# Patient Record
Sex: FEMALE | Race: WHITE | NOT HISPANIC OR LATINO | Employment: UNEMPLOYED | URBAN - METROPOLITAN AREA
[De-identification: names, ages, dates, MRNs, and addresses within clinical notes are randomized per-mention and may not be internally consistent; named-entity substitution may affect disease eponyms.]

---

## 2021-02-11 ENCOUNTER — HOSPITAL ENCOUNTER (OUTPATIENT)
Dept: LAB | Facility: MEDICAL CENTER | Age: 31
End: 2021-02-11
Attending: FAMILY MEDICINE
Payer: COMMERCIAL

## 2021-02-11 LAB
COVID ORDER STATUS COVID19: NORMAL
SARS-COV-2 RNA RESP QL NAA+PROBE: NOTDETECTED
SPECIMEN SOURCE: NORMAL

## 2021-02-11 PROCEDURE — C9803 HOPD COVID-19 SPEC COLLECT: HCPCS

## 2021-02-11 PROCEDURE — U0003 INFECTIOUS AGENT DETECTION BY NUCLEIC ACID (DNA OR RNA); SEVERE ACUTE RESPIRATORY SYNDROME CORONAVIRUS 2 (SARS-COV-2) (CORONAVIRUS DISEASE [COVID-19]), AMPLIFIED PROBE TECHNIQUE, MAKING USE OF HIGH THROUGHPUT TECHNOLOGIES AS DESCRIBED BY CMS-2020-01-R: HCPCS

## 2021-02-11 PROCEDURE — U0005 INFEC AGEN DETEC AMPLI PROBE: HCPCS

## 2025-01-08 ENCOUNTER — OFFICE VISIT (OUTPATIENT)
Dept: URGENT CARE | Facility: CLINIC | Age: 35
End: 2025-01-08
Payer: COMMERCIAL

## 2025-01-08 ENCOUNTER — HOSPITAL ENCOUNTER (OUTPATIENT)
Dept: RADIOLOGY | Facility: MEDICAL CENTER | Age: 35
End: 2025-01-08
Attending: PHYSICIAN ASSISTANT
Payer: COMMERCIAL

## 2025-01-08 VITALS
TEMPERATURE: 97.2 F | HEIGHT: 62 IN | BODY MASS INDEX: 20.65 KG/M2 | DIASTOLIC BLOOD PRESSURE: 100 MMHG | RESPIRATION RATE: 13 BRPM | OXYGEN SATURATION: 100 % | HEART RATE: 77 BPM | WEIGHT: 112.2 LBS | SYSTOLIC BLOOD PRESSURE: 138 MMHG

## 2025-01-08 DIAGNOSIS — R03.0 ELEVATED BLOOD PRESSURE READING: ICD-10-CM

## 2025-01-08 DIAGNOSIS — F32.A DEPRESSION, UNSPECIFIED DEPRESSION TYPE: ICD-10-CM

## 2025-01-08 DIAGNOSIS — R10.11 RIGHT UPPER QUADRANT PAIN: ICD-10-CM

## 2025-01-08 DIAGNOSIS — N20.2 CALCULUS OF KIDNEY WITH CALCULUS OF URETER: ICD-10-CM

## 2025-01-08 DIAGNOSIS — F10.10 ALCOHOL ABUSE: ICD-10-CM

## 2025-01-08 LAB
APPEARANCE UR: CLEAR
BILIRUB UR STRIP-MCNC: NEGATIVE MG/DL
COLOR UR AUTO: YELLOW
GLUCOSE UR STRIP.AUTO-MCNC: NEGATIVE MG/DL
KETONES UR STRIP.AUTO-MCNC: NEGATIVE MG/DL
LEUKOCYTE ESTERASE UR QL STRIP.AUTO: NEGATIVE
NITRITE UR QL STRIP.AUTO: NEGATIVE
PH UR STRIP.AUTO: 7 [PH] (ref 5–8)
POCT INT CON NEG: NEGATIVE
POCT INT CON POS: POSITIVE
POCT URINE PREGNANCY TEST: NEGATIVE
PROT UR QL STRIP: NEGATIVE MG/DL
RBC UR QL AUTO: NEGATIVE
SP GR UR STRIP.AUTO: 1.01
UROBILINOGEN UR STRIP-MCNC: 0.2 MG/DL

## 2025-01-08 PROCEDURE — 81002 URINALYSIS NONAUTO W/O SCOPE: CPT | Performed by: PHYSICIAN ASSISTANT

## 2025-01-08 PROCEDURE — 3075F SYST BP GE 130 - 139MM HG: CPT | Performed by: PHYSICIAN ASSISTANT

## 2025-01-08 PROCEDURE — 99214 OFFICE O/P EST MOD 30 MIN: CPT | Performed by: PHYSICIAN ASSISTANT

## 2025-01-08 PROCEDURE — 81025 URINE PREGNANCY TEST: CPT | Performed by: PHYSICIAN ASSISTANT

## 2025-01-08 PROCEDURE — 76705 ECHO EXAM OF ABDOMEN: CPT

## 2025-01-08 PROCEDURE — 3080F DIAST BP >= 90 MM HG: CPT | Performed by: PHYSICIAN ASSISTANT

## 2025-01-08 RX ORDER — GABAPENTIN 300 MG/1
300 CAPSULE ORAL 3 TIMES DAILY
COMMUNITY

## 2025-01-08 RX ORDER — NALTREXONE HYDROCHLORIDE 50 MG/1
100 TABLET, FILM COATED ORAL DAILY
COMMUNITY
Start: 2024-12-02

## 2025-01-08 RX ORDER — BUPROPION HYDROCHLORIDE 300 MG/1
300 TABLET ORAL EVERY MORNING
Qty: 30 TABLET | Refills: 0 | Status: SHIPPED | OUTPATIENT
Start: 2025-01-08

## 2025-01-08 RX ORDER — BUPROPION HYDROCHLORIDE 300 MG/1
300 TABLET ORAL DAILY
COMMUNITY
Start: 2024-12-01

## 2025-01-08 NOTE — PROGRESS NOTES
Subjective:     Verbal consent was acquired by the patient to use Nex[employer] ambient listening note generation during this visit     Ofelia Carvajal is a 35 y.o. female who presents for RUQ Pain (Persistent pain, the last 3 weeks, she would like a stat US, she has a current order  )       History of Present Illness  The patient is a 35-year-old female who presents for evaluation of right upper quadrant pain, alcohol use disorder, and depression.    She reports experiencing a variety of pain sensations, including gnawing, stabbing, burning, and dull aches, which have been ongoing for the past 3 weeks. The pain is localized to the right side and does not radiate to the left. She has not identified any specific food triggers for her pain, although she did consume a burger yesterday without noticing any exacerbation of her symptoms. The pain tends to worsen in the evening and is currently constant. She has abstained from alcohol and maintains a healthy diet, although she admits to recent deviations due to the holiday season. She continues to exercise regularly. She reports no associated symptoms such as vomiting, diarrhea, or abnormal stools. She also reports no hematuria or fever. However, she has noticed a slight darkening of her urine, but it is not a cause for concern. She reports no dysuria, urinary frequency, or urgency. She does not experience heartburn, burping, belching, bloating, or acid reflux. She has undergone lab work and is awaiting the results. She anticipates elevated triglyceride levels due to her alcohol consumption. She has a history of heavy alcohol use but has recently ceased this habit. She has previously sought treatment at a clinic and has an order for an ultrasound, but the pain has progressively worsened. The discomfort has been disrupting her sleep and causing significant anxiety. She is unable to schedule an ultrasound for several weeks and is seeking immediate assistance.    She has a  "history of alcohol use disorder and has previously been in recovery. She is aware of the availability of Alcoholics Anonymous (AA) and has found naltrexone beneficial. She is currently under the care of a therapist at [facility]    She is currently taking Wellbutrin  mg for depression and is seeking a refill of this prescription. She has been on this medication for 2 years and has found it beneficial. She is unable to tolerate SSRIs due to an MTHFR mutation. She has previously consulted with a functional medicine practitioner but has not seen her recently as she is leaving her practice.    Supplemental Information  She has a history of PCOS since the age of 13 and has recently undergone a transvaginal ultrasound, which revealed a descending bladder. She experiences numbness and tingling in her vagina and cervix, but these symptoms have not been attributed to any specific cause. She has regular menstrual cycles.    SOCIAL HISTORY  The patient admits to being a heavy drinker. She is a student.    FAMILY HISTORY  She reports no family history of bladder or gallbladder issues.                Medications:  buPROPion  gabapentin Caps  naltrexone Tabs    Allergies:             Penicillins    Past Social Hx:  Ofelia MELI Alena  reports that she has quit smoking. Her smoking use included cigarettes. She does not have any smokeless tobacco history on file. She reports current alcohol use. She reports that she does not use drugs.           Problem list, medications, and allergies reviewed by myself today in Epic.     Objective:     BP (!) 138/100 (BP Location: Left arm, Patient Position: Sitting, BP Cuff Size: Adult)   Pulse 77   Temp 36.2 °C (97.2 °F) (Temporal)   Resp 13   Ht 1.575 m (5' 2\")   Wt 50.9 kg (112 lb 3.2 oz)   SpO2 100%   BMI 20.52 kg/m²     Physical Exam  Vitals and nursing note reviewed.   Constitutional:       General: She is not in acute distress.     Appearance: Normal appearance. She is not " ill-appearing, toxic-appearing or diaphoretic.   HENT:      Nose: Nose normal.      Mouth/Throat:      Mouth: Mucous membranes are moist.      Pharynx: No oropharyngeal exudate or posterior oropharyngeal erythema.   Eyes:      Extraocular Movements: Extraocular movements intact.      Pupils: Pupils are equal, round, and reactive to light.   Cardiovascular:      Rate and Rhythm: Normal rate and regular rhythm.      Pulses: Normal pulses.      Heart sounds: Normal heart sounds.   Pulmonary:      Effort: Pulmonary effort is normal. No tachypnea, accessory muscle usage, prolonged expiration, respiratory distress or retractions.      Breath sounds: Normal breath sounds and air entry. No stridor or decreased air movement. No decreased breath sounds, wheezing, rhonchi or rales.      Comments: Lungs cta b/l  Abdominal:      General: Abdomen is flat. Bowel sounds are normal. There is no distension. There are no signs of injury.      Palpations: Abdomen is soft. There is no splenomegaly, mass or pulsatile mass.      Tenderness: There is abdominal tenderness in the right upper quadrant. There is no right CVA tenderness, left CVA tenderness, guarding or rebound. Negative signs include Naik's sign, Rovsing's sign and McBurney's sign.      Hernia: No hernia is present.          Comments: Right upper quadrant tenderness noted.  Abdomen soft without guarding or rebound.  No CVA tenderness.  Negative Naik's.  Negative McBurney's.   Musculoskeletal:      Cervical back: No rigidity.   Lymphadenopathy:      Cervical: No cervical adenopathy.   Neurological:      Mental Status: She is alert.         UA negative  Urine hcg negative        RADIOLOGY RESULTS   US-RUQ    Result Date: 1/8/2025 1/8/2025 10:06 AM HISTORY/REASON FOR EXAM:  Pain; 3 week right upper quadrant pain Abdominal pain TECHNIQUE/EXAM DESCRIPTION AND NUMBER OF VIEWS:  Real-time sonography of the liver and biliary tree. COMPARISON: None FINDINGS: The liver measures  12.76 cm. The liver is heterogeneous with increased echogenicity. The echogenicity limits evaluation for liver mass. However, there is no evidence of solid mass lesion. The gallbladder is normal without wall thickening or calculi. The gallbladder wall thickness measures 1.70 mm. There is no pericholecystic fluid. The common duct measures 3.50 mm in diameter. The visualized pancreas is unremarkable. The visualized aorta is normal in caliber. Intrahepatic IVC is patent. The portal vein is patent with hepatopetal flow. The MPV measures 0.9 cm. The right kidney measures 10.01 cm. The right kidney has normal cortical size and echotexture. There is no hydronephrosis. There is a 3 mm calculus in the interpolar region There is no ascites.     1. Echogenic liver, most commonly hepatic steatosis. 2. Nonobstructive right renal calculus.              Assessment/Plan:     Diagnosis and Associated Orders:     1. Elevated blood pressure reading    2. Alcohol abuse    3. Right upper quadrant pain  - US-RUQ; Future  - POCT Urinalysis  - POCT PREGNANCY    4. Depression, unspecified depression type  - buPROPion (WELLBUTRIN XL) 300 MG XL tablet; Take 1 Tablet by mouth every morning.  Dispense: 30 Tablet; Refill: 0  - Referral to Psychiatry    5. Calculus of kidney with calculus of ureter    Other orders  - buPROPion (WELLBUTRIN XL) 300 MG XL tablet; Take 300 mg by mouth every day.  - naltrexone (DEPADE) 50 MG Tab; Take 100 mg by mouth every day.  - gabapentin (NEURONTIN) 300 MG Cap; Take 300 mg by mouth 3 times a day.        Medical Decision Making:    Pleasant 35 y.o. presents to clinic with:    Assessment & Plan  1. Right upper quadrant pain.  The differential diagnosis includes cholecystitis, cholelithiasis, hepatic pathology, inflammatory bowel disease, irritable bowel syndrome, and renal calculi.  Given finding on ultrasound of nonobstructive 3 mm right renal calculus I suspect this is causation of her symptoms.  Recommending  increase fluid intake, referral placed to urology, NSAIDs such as ibuprofen 600 mg 3 times a day.  She is advised to strain her urine.  Pulmonary function is unimpaired, and auscultation reveals clear lung sounds, suggesting that the pain is not of pulmonary origin.  Abdominal exam demonstrates no guarding or rebound tenderness.  No peritoneal signs.  No signs of surgical abdomen.  2. Alcohol use disorder.  She has a history of heavy drinking and is currently attempting to stop. She is encouraged to continue using naltrexone as needed and to seek support from  or other resources. A referral to Dr. Trinidad at [facility] will be made for further evaluation and management of her alcohol use disorder.    3. Depression.  She is currently on Wellbutrin  mg daily and reports that it is not significantly helping but does not cause adverse effects. A prescription refill for Wellbutrin  mg will be provided. She is also referred to Dr. Trinidad at [facility] for further psychiatric evaluation and management.  biliary pathology (cholecystitis, cholelithiasis, biliary colic, etc), colitis, diverticulitis, inflammatory bowel disease, irritable bowel syndrome, pyelonephritis, nephrolithiasis; considered but less likely: pneumonia, hepatitis, hepatic abscess, aortic dissection.    I personally reviewed prior external notes and test results pertinent to today's visit. Patient is clinically stable at today's urgent care visit.  Patient appears nontoxic with no acute distress noted. Appropriate for outpatient care at this time.  Return to clinic or go to ED if symptoms worsen or persist.  Red flag symptoms discussed.  Patient/Parent/Guardian voices understanding.   All side effects of medication discussed including allergic response, GI upset, tendon injury, rash, sedation etc    Please note that this dictation was created using voice recognition software. I have made a reasonable attempt to correct obvious  errors, but I expect that there are errors of grammar and possibly content that I did not discover before finalizing the note.    This note was electronically signed by Skyla Hernandez PA-C

## 2025-02-04 ENCOUNTER — OFFICE VISIT (OUTPATIENT)
Dept: UROLOGY | Facility: MEDICAL CENTER | Age: 35
End: 2025-02-04
Payer: COMMERCIAL

## 2025-02-04 VITALS — TEMPERATURE: 98.5 F

## 2025-02-04 DIAGNOSIS — R10.11 RIGHT UPPER QUADRANT ABDOMINAL PAIN: ICD-10-CM

## 2025-02-04 DIAGNOSIS — N20.0 RENAL STONE: Primary | ICD-10-CM

## 2025-02-04 PROCEDURE — 99203 OFFICE O/P NEW LOW 30 MIN: CPT | Performed by: PHYSICIAN ASSISTANT

## 2025-02-04 NOTE — PROGRESS NOTES
Reason for visit:   3 mm RIGHT intraparenchymal renal stone    HPI   Ofelia Carvajal is a 35 y.o. female presenting for evaluation of an incidental findings of a 3 mm RIGHT intraparenchymal renal stone.      The patient reports that she has had 2 months of RUQ pain. She reports that the pain was severe in nature, so she presented to urgent care for evaluation. A RUQ US was performed and the stone was noted. She was referred to urology.     The patient denies any prior history of kidney stones and denies any history of recurrent UTIs. The patient denies any family history of stones or  CA. The patient is a current cigarette smoker, x10 years. The patient denies any history of gross hematuria and denies dysuria today.    The patient reports that she does consume an excessive amount of alcohol. She was recently seen by a GYN provider at [facility] that told her that her levels of DHEA were high so she may have an adrenal tumor. She has not had any further work-up regarding this finding. Of note, she has a diagnosis of PCOS.     Past Medical History:   Diagnosis Date    PCOS (polycystic ovarian syndrome)     Thyroid disease     hypothyroid         No past surgical history on file.         Urologic PMH:    Denies     Family History:   Denies     Social History     Socioeconomic History    Marital status: Single     Spouse name: Not on file    Number of children: Not on file    Years of education: Not on file    Highest education level: Not on file   Occupational History    Not on file   Tobacco Use    Smoking status: Former     Types: Cigarettes    Smokeless tobacco: Not on file   Substance and Sexual Activity    Alcohol use: Yes     Comment: heavy    Drug use: Never    Sexual activity: Not on file   Other Topics Concern    Not on file   Social History Narrative    Not on file     Social Drivers of Health     Financial Resource Strain: Not on file   Food Insecurity: Not on file   Transportation Needs: Not on file   Physical  Activity: Not on file   Stress: Not on file   Social Connections: Not on file   Intimate Partner Violence: Not on file   Housing Stability: Not on file         Allergies   Allergen Reactions    Penicillins Anaphylaxis         Current Outpatient Medications   Medication Sig Dispense Refill    buPROPion (WELLBUTRIN XL) 300 MG XL tablet Take 300 mg by mouth every day.      naltrexone (DEPADE) 50 MG Tab Take 100 mg by mouth every day.      gabapentin (NEURONTIN) 300 MG Cap Take 300 mg by mouth 3 times a day.      buPROPion (WELLBUTRIN XL) 300 MG XL tablet Take 1 Tablet by mouth every morning. 30 Tablet 0     No current facility-administered medications for this visit.         Review of Systems:   GENERAL: No fever, chills, nausea, vomiting   RESPIRATORY: No shortness of breath, wheezing, or cough   CARDIAC: No chest pain, palpitations, irregular heart rhythm, or chest pressure   GASTROINTESTINAL: No abdominal pain or distention    GENITROURINARY: See HPI   NEUROLOGIC: No weakness, blackouts, seizure or stroke   HEMATOLOGIC: No history of bleeding disorders, hypercoagulability, DVT, or PE   ENDOCRINE: No history of diabetes, adrenal disorders, or thyroid disorders    All other review of systems was negative     Vitals:    02/04/25 1345   Temp: 36.9 °C (98.5 °F)   TempSrc: Temporal         Physical Exam:   General:  Alert & Oriented, NAD   Skin: Warm/Dry, no evidence of infection or rash   HEENT: normocephalic   Thorax: No CVA tenderness   Abdomen: Soft, nontender to superficial/deep palpation, nondistended  Extremities: No distal edema     Assessment and plan:     3 mm RIGHT intraparenchymal renal stone    -- Urinalysis from 1/8/25 reviewed -- WNL   -- I personally reviewed the patient's RUQ US and we discussed that stone appears to be intraparenchymal, which would mean that it cannot move into the collecting system and cause pain or obstruction  -- Recommended dedicated [facility] imaging to fully evaluate -- CT A/P renal stone  protocol ordered  -- Good daily water intake of at least 2 L  -- Follow-up in 4-6 weeks to review results of CT -- I will call if there are any urgent/concerning findings on CT    I personally reviewed the patient's pertinent medical records and labs/images available to me.     Nakul Garnett PA-C

## 2025-02-11 ENCOUNTER — HOSPITAL ENCOUNTER (OUTPATIENT)
Dept: RADIOLOGY | Facility: MEDICAL CENTER | Age: 35
End: 2025-02-11
Attending: PHYSICIAN ASSISTANT
Payer: COMMERCIAL

## 2025-02-11 ENCOUNTER — TELEPHONE (OUTPATIENT)
Dept: SCHEDULING | Facility: IMAGING CENTER | Age: 35
End: 2025-02-11
Payer: COMMERCIAL

## 2025-02-11 DIAGNOSIS — R10.11 RIGHT UPPER QUADRANT ABDOMINAL PAIN: ICD-10-CM

## 2025-02-11 PROCEDURE — 74176 CT ABD & PELVIS W/O CONTRAST: CPT

## 2025-02-12 ENCOUNTER — RESULTS FOLLOW-UP (OUTPATIENT)
Dept: UROLOGY | Facility: MEDICAL CENTER | Age: 35
End: 2025-02-12

## 2025-06-27 ENCOUNTER — OFFICE VISIT (OUTPATIENT)
Dept: MEDICAL GROUP | Facility: MEDICAL CENTER | Age: 35
End: 2025-06-27
Payer: COMMERCIAL

## 2025-06-27 VITALS
TEMPERATURE: 98.3 F | BODY MASS INDEX: 20.35 KG/M2 | HEIGHT: 62 IN | RESPIRATION RATE: 18 BRPM | WEIGHT: 110.6 LBS | HEART RATE: 81 BPM | OXYGEN SATURATION: 98 % | SYSTOLIC BLOOD PRESSURE: 118 MMHG | DIASTOLIC BLOOD PRESSURE: 86 MMHG

## 2025-06-27 DIAGNOSIS — F41.1 GENERALIZED ANXIETY DISORDER: Primary | ICD-10-CM

## 2025-06-27 DIAGNOSIS — F33.0 MILD EPISODE OF RECURRENT MAJOR DEPRESSIVE DISORDER (HCC): ICD-10-CM

## 2025-06-27 DIAGNOSIS — Z15.89 MTHFR GENE MUTATION: ICD-10-CM

## 2025-06-27 DIAGNOSIS — E55.9 VITAMIN D DEFICIENCY: ICD-10-CM

## 2025-06-27 DIAGNOSIS — Z00.00 WELLNESS EXAMINATION: ICD-10-CM

## 2025-06-27 DIAGNOSIS — F10.90 ALCOHOL USE: ICD-10-CM

## 2025-06-27 DIAGNOSIS — R53.83 FATIGUE, UNSPECIFIED TYPE: ICD-10-CM

## 2025-06-27 PROBLEM — F41.9 ANXIETY DISORDER: Status: ACTIVE | Noted: 2018-04-30

## 2025-06-27 PROBLEM — R21 VULVAR RASH: Status: ACTIVE | Noted: 2017-12-14

## 2025-06-27 PROBLEM — L80 VITILIGO: Status: ACTIVE | Noted: 2018-04-30

## 2025-06-27 PROCEDURE — 3074F SYST BP LT 130 MM HG: CPT | Performed by: STUDENT IN AN ORGANIZED HEALTH CARE EDUCATION/TRAINING PROGRAM

## 2025-06-27 PROCEDURE — 99214 OFFICE O/P EST MOD 30 MIN: CPT | Performed by: STUDENT IN AN ORGANIZED HEALTH CARE EDUCATION/TRAINING PROGRAM

## 2025-06-27 PROCEDURE — 3079F DIAST BP 80-89 MM HG: CPT | Performed by: STUDENT IN AN ORGANIZED HEALTH CARE EDUCATION/TRAINING PROGRAM

## 2025-06-27 RX ORDER — LANOLIN ALCOHOL/MO/W.PET/CERES
100 CREAM (GRAM) TOPICAL DAILY
Qty: 30 TABLET | Refills: 3 | Status: SHIPPED | OUTPATIENT
Start: 2025-06-27

## 2025-06-27 ASSESSMENT — ANXIETY QUESTIONNAIRES
5. BEING SO RESTLESS THAT IT IS HARD TO SIT STILL: NEARLY EVERY DAY
7. FEELING AFRAID AS IF SOMETHING AWFUL MIGHT HAPPEN: NOT AT ALL
3. WORRYING TOO MUCH ABOUT DIFFERENT THINGS: NEARLY EVERY DAY
IF YOU CHECKED OFF ANY PROBLEMS ON THIS QUESTIONNAIRE, HOW DIFFICULT HAVE THESE PROBLEMS MADE IT FOR YOU TO DO YOUR WORK, TAKE CARE OF THINGS AT HOME, OR GET ALONG WITH OTHER PEOPLE: EXTREMELY DIFFICULT
4. TROUBLE RELAXING: NEARLY EVERY DAY
2. NOT BEING ABLE TO STOP OR CONTROL WORRYING: NEARLY EVERY DAY
GAD7 TOTAL SCORE: 18
6. BECOMING EASILY ANNOYED OR IRRITABLE: NEARLY EVERY DAY
1. FEELING NERVOUS, ANXIOUS, OR ON EDGE: NEARLY EVERY DAY

## 2025-06-27 ASSESSMENT — PATIENT HEALTH QUESTIONNAIRE - PHQ9
5. POOR APPETITE OR OVEREATING: 3 - NEARLY EVERY DAY
SUM OF ALL RESPONSES TO PHQ QUESTIONS 1-9: 24
CLINICAL INTERPRETATION OF PHQ2 SCORE: 6

## 2025-06-27 NOTE — LETTER
[facility]  Kristy Hinton M.D.  [address]  [place] [address]  Fax: [phone]   Authorization for Release/Disclosure of   Protected Health Information   Name: RACHNA RAMOS : 1990 SSN: [id number]   Address: [address] [place] [address]  [place] [address] Phone:    [phone] (home)    I authorize the entity listed below to release/disclose the PHI below to:   [facility]/Kristy Hinton M.D. and Kristy Hinton M.D.   Provider or Entity Name:     Address   City, State, Zip   Phone:    Fax:   Reason for request: continuity of care   Information to be released:    [  ] LAST COLONOSCOPY,  including any PATH REPORT and follow-up  [  ] LAST FIT/COLOGUARD RESULT [  ] LAST DEXA  [  ] LAST MAMMOGRAM  [  ] LAST PAP  [  ] LAST LABS [  ] RETINA EXAM REPORT  [  ] IMMUNIZATION RECORDS  [  ] Release all info      [  ] Check here and initial the line next to each item to release ALL health information INCLUDING  _____ Care and treatment for drug and / or alcohol abuse  _____ HIV testing, infection status, or AIDS  _____ Genetic Testing    DATES OF SERVICE OR TIME PERIOD TO BE DISCLOSED: _____________  I understand and acknowledge that:  * This Authorization may be revoked at any time by you in writing, except if your health information has already been used or disclosed.  * Your health information that will be used or disclosed as a result of you signing this authorization could be re-disclosed by the recipient. If this occurs, your re-disclosed health information may no longer be protected by State or Federal laws.  * You may refuse to sign this Authorization. Your refusal will not affect your ability to obtain treatment.  * This Authorization becomes effective upon signing and will  on (date) __________.      If no date is indicated, this Authorization will  one (1) year from the signature date.    Name: Rachna Ramos  Signature: Date:        PLEASE FAX REQUESTED RECORDS BACK TO: [phone]

## 2025-07-02 NOTE — PROGRESS NOTES
Subjective:     CC:   Chief Complaint   Patient presents with    Establish Care    Requesting Labs     Pt's been DX: fatty liver and kidney stone  Poss Hormonal imbalance, hair on places she didn't have and irregular periods x 2-3 mo. HX: PCOS     Anxiety     With trouble sleeping x years           HPI, Assessment & Plan:     35 y.o. female here to establish care . Requesting labs. Has history of fatty liver and renal stone. Currently no flank pain or blood in urine. Does report concern for possible hormone imbalance as she has irregular periods with history of PCOS. She is following up with OBGYN . She also reports having anxiety and mood changes which affects her sleep . This is on going for years      1. Generalized anxiety disorder (Primary)  2. Mild episode of recurrent major depressive disorder (HCC)  Chronic, not well controlled  Seen by psychiatrist in past and has a therapist at present    plan:  Discussed establishing care with psychiatry again  Discussed possible SSRI and SNRI treatment. Reports that due to MTHFR gene mutations she was told these medications might not work that well for her . She has prescription for 300 mg dose of Wellbutrin but has not started it yet. She is planning to start it . She is interested in establishing care with new psychiatrist  -recommended to continue therapy with psychologist   - Patient has been identified as having a positive depression screening. Appropriate orders and counseling have been given.  - TSH WITH REFLEX TO FT4; Future  - Referral to Behavioral Health  - follow up in 4-6 weeks    3. MTHFR gene mutation  Chronic,stable    4. Alcohol use  Chronic, not well controlled   Reports that she takes naltrexone 100 mg daily which does help to decrease craving but due to sleep issue and anxiety /stressors she is still drinking . Reports recent death of close friend which has been hard on her as well as other personal stressors.   -  Counseling provided.   -recommended  "treatment of anxiety . F/u with psychiatry  -declined starting SSRI/SNRI at present   -declined methyl folate prescription as reports they were expensive and not much helpful in past . but is agreeable to buy online and give it another try  - VITAMIN B12; Future  - VITAMIN B1; Future  - thiamine (THIAMINE) 100 MG tablet; Take 1 Tablet by mouth every day.  Dispense: 30 Tablet; Refill: 3    5. Fatigue, unspecified type  Chronic   - VITAMIN B12; Future  - VITAMIN B1; Future    6. healthcare  - Comp Metabolic Panel; Future  - Lipid Profile; Future  - CBC WITHOUT DIFFERENTIAL; Future  - TSH WITH REFLEX TO FT4; Future  - HEMOGLOBIN A1C; Future    7. Vitamin D deficiency  Reports history of vit D def   - VITAMIN D,25 HYDROXY (DEFICIENCY); Future      Health Maintenance: Completed    ROS:  ROS    Review of systems unremarkable except for concerns noted by patient or items listed.    Please see HPI for additional ROS.      Objective:     Exam:  /86 (BP Location: Left arm, Patient Position: Sitting, BP Cuff Size: Adult)   Pulse 81   Temp 36.8 °C (98.3 °F) (Temporal)   Resp 18   Ht 1.575 m (5' 2\")   Wt 50.2 kg (110 lb 9.6 oz)   LMP 06/20/2025   SpO2 98%   BMI 20.23 kg/m²  Body mass index is 20.23 kg/m².    Physical Exam  Constitutional:       Appearance: Normal appearance.   HENT:      Head: Normocephalic.   Eyes:      General: No scleral icterus.  Cardiovascular:      Rate and Rhythm: Normal rate and regular rhythm.      Pulses: Normal pulses.      Heart sounds: Normal heart sounds.   Pulmonary:      Effort: Pulmonary effort is normal.      Breath sounds: Normal breath sounds.   Musculoskeletal:      Right lower leg: No edema.      Left lower leg: No edema.   Skin:     General: Skin is warm.   Neurological:      Mental Status: She is alert and oriented to person, place, and time.   Psychiatric:         Mood and Affect: Mood normal.         Behavior: Behavior normal.                   Please note that this " dictation was created using voice recognition software. I have made every reasonable attempt to correct obvious errors, but I expect that there are errors of grammar and possibly content that I did not discover before finalizing the note.

## 2025-07-07 NOTE — Clinical Note
REFERRAL APPROVAL NOTICE         Sent on July 7, 2025                   Ofelia MELI Carvajal  [address]  [place] [address]                   Dear MsJosselyn Alena,    After a careful review of the medical information and benefit coverage, [facility] has processed your referral. See below for additional details.    If applicable, you must be actively enrolled with your insurance for coverage of the authorized service. If you have any questions regarding your coverage, please contact your insurance directly.    REFERRAL INFORMATION   Referral #:  [id number]  Referred-To Department    Referred-By Provider:  Behavioral Health    Kristy Hinton M.D.   Behavioral Health Outpatient      [address]  [address]  [place] [address]  [phone] [address]  [place] [id number] [phone]    Referral Start Date:  06/27/2025  Referral End Date:   06/27/2026             SCHEDULING  If you do not already have an appointment, please call [phone] to make an appointment.     MORE INFORMATION  If you do not already have a [employer] account, sign up at: [email or web address]  You can access your medical information, make appointments, see lab results, billing information, and more.  If you have questions regarding this referral, please contact  the [facility] Referrals department at:             [phone]. Monday - Friday 8:00AM - 5:00PM.     Sincerely,    [facility]

## 2025-07-28 DIAGNOSIS — Z00.6 CLINICAL TRIAL PARTICIPANT: ICD-10-CM

## 2025-07-31 ENCOUNTER — HOSPITAL ENCOUNTER (OUTPATIENT)
Facility: MEDICAL CENTER | Age: 35
End: 2025-07-31
Attending: FAMILY MEDICINE

## 2025-07-31 ENCOUNTER — HOSPITAL ENCOUNTER (OUTPATIENT)
Facility: MEDICAL CENTER | Age: 35
End: 2025-07-31
Attending: STUDENT IN AN ORGANIZED HEALTH CARE EDUCATION/TRAINING PROGRAM
Payer: COMMERCIAL

## 2025-07-31 DIAGNOSIS — Z00.6 CLINICAL TRIAL PARTICIPANT: ICD-10-CM

## 2025-08-11 ENCOUNTER — RESULTS FOLLOW-UP (OUTPATIENT)
Dept: MEDICAL GROUP | Facility: PHYSICIAN GROUP | Age: 35
End: 2025-08-11
Payer: COMMERCIAL

## 2025-08-11 LAB
APOB+LDLR+PCSK9 GENE MUT ANL BLD/T: NOT DETECTED
BRCA1+BRCA2 DEL+DUP + FULL MUT ANL BLD/T: NOT DETECTED
MLH1+MSH2+MSH6+PMS2 GN DEL+DUP+FUL M: NOT DETECTED

## 2025-08-18 ENCOUNTER — OFFICE VISIT (OUTPATIENT)
Dept: MEDICAL GROUP | Facility: MEDICAL CENTER | Age: 35
End: 2025-08-18
Payer: COMMERCIAL

## 2025-08-18 VITALS
DIASTOLIC BLOOD PRESSURE: 80 MMHG | OXYGEN SATURATION: 97 % | HEART RATE: 79 BPM | BODY MASS INDEX: 20.83 KG/M2 | WEIGHT: 113.2 LBS | TEMPERATURE: 98.3 F | SYSTOLIC BLOOD PRESSURE: 122 MMHG | HEIGHT: 62 IN

## 2025-08-18 DIAGNOSIS — R79.89 HIGH SERUM DEHYDROEPIANDROSTERONE (DHEA): ICD-10-CM

## 2025-08-18 DIAGNOSIS — R10.10 PAIN LOCALIZED TO UPPER ABDOMEN: Primary | ICD-10-CM

## 2025-08-18 DIAGNOSIS — E87.1 LOW SODIUM LEVELS: ICD-10-CM

## 2025-08-18 DIAGNOSIS — F10.90 ALCOHOL USE: ICD-10-CM

## 2025-08-18 DIAGNOSIS — F41.1 GENERALIZED ANXIETY DISORDER: ICD-10-CM

## 2025-08-18 DIAGNOSIS — F33.2 SEVERE EPISODE OF RECURRENT MAJOR DEPRESSIVE DISORDER, WITHOUT PSYCHOTIC FEATURES (HCC): ICD-10-CM

## 2025-08-18 DIAGNOSIS — Z15.89 MTHFR GENE MUTATION: ICD-10-CM

## 2025-08-18 PROCEDURE — 3079F DIAST BP 80-89 MM HG: CPT | Performed by: STUDENT IN AN ORGANIZED HEALTH CARE EDUCATION/TRAINING PROGRAM

## 2025-08-18 PROCEDURE — 3074F SYST BP LT 130 MM HG: CPT | Performed by: STUDENT IN AN ORGANIZED HEALTH CARE EDUCATION/TRAINING PROGRAM

## 2025-08-18 PROCEDURE — 99214 OFFICE O/P EST MOD 30 MIN: CPT | Performed by: STUDENT IN AN ORGANIZED HEALTH CARE EDUCATION/TRAINING PROGRAM

## 2025-08-18 RX ORDER — PANTOPRAZOLE SODIUM 40 MG/1
40 TABLET, DELAYED RELEASE ORAL DAILY
Qty: 30 TABLET | Refills: 1 | Status: SHIPPED | OUTPATIENT
Start: 2025-08-18

## 2025-08-18 ASSESSMENT — FIBROSIS 4 INDEX: FIB4 SCORE: 0.73

## 2025-09-10 ENCOUNTER — APPOINTMENT (OUTPATIENT)
Dept: MEDICAL GROUP | Facility: MEDICAL CENTER | Age: 35
End: 2025-09-10
Payer: COMMERCIAL